# Patient Record
Sex: FEMALE | Race: ASIAN | Employment: OTHER | URBAN - METROPOLITAN AREA
[De-identification: names, ages, dates, MRNs, and addresses within clinical notes are randomized per-mention and may not be internally consistent; named-entity substitution may affect disease eponyms.]

---

## 2017-02-17 DIAGNOSIS — E78.00 ELEVATED CHOLESTEROL: ICD-10-CM

## 2017-02-17 DIAGNOSIS — Z23 NEED FOR PROPHYLACTIC VACCINATION AND INOCULATION AGAINST INFLUENZA: Primary | ICD-10-CM

## 2017-02-17 DIAGNOSIS — E03.9 HYPOTHYROIDISM: ICD-10-CM

## 2017-02-17 PROCEDURE — 90686 IIV4 VACC NO PRSV 0.5 ML IM: CPT | Mod: SL | Performed by: FAMILY MEDICINE

## 2017-02-17 PROCEDURE — 36415 COLL VENOUS BLD VENIPUNCTURE: CPT | Performed by: FAMILY MEDICINE

## 2017-02-17 PROCEDURE — 90471 IMMUNIZATION ADMIN: CPT | Performed by: FAMILY MEDICINE

## 2017-02-17 NOTE — PROGRESS NOTES
Injectable Influenza Immunization Documentation    1.  Is the person to be vaccinated sick today?  No    2. Does the person to be vaccinated have an allergy to eggs or to a component of the vaccine?  No    3. Has the person to be vaccinated today ever had a serious reaction to influenza vaccine in the past?  No    4. Has the person to be vaccinated ever had Guillain-[place] syndrome?  No     Form completed by Christiano Pretty    Future orders released today

## 2017-02-17 NOTE — LETTER
[facility] [address] [place]  [address]  Phone: [phone]    February 20, 2017      Frances Lai  [address]  [facility] [address]              Dear Frances,    The results from your recent visit showed the thyro  id test is abnormal.   It appears you are not taking enough thyroid medicine. Unfortunately the last drug change looks like it was decreased and the response I see doesn't make sense to me.     Please make and appointment to discuss with Dr Luis.Stay on the thyroid medicine until  You see her.     Sincerely,      Dr Maxx Alba MD for Dr. Luis who is out of the office.          Results for orders placed or performed in visit on 02/17/17   TSH ([facility])   Result Value Ref Range    TSH 12.980 (H) 0.450 - 4.500 uIU/mL    Narrative    Performed at:  01 - [facility] [address], [place], [place]  [id number]  : Meño Peterson MD, Phone:  [phone]   Lipid Panel ([facility])   Result Value Ref Range    Cholesterol 224 (H) 100 - 199 mg/dL    Triglycerides 88 0 - 149 mg/dL    HDL Cholesterol 59 >39 mg/dL    VLDL Cholesterol Benito 18 5 - 40 mg/dL    LDL Cholesterol Calculated 147 (H) 0 - 99 mg/dL    LDL/HDL Ratio 2.5 0.0 - 3.2 ratio units    Narrative    Performed at:  01 - [facility] [address], [place], [place]  [id number]  : Meño Peterson MD, Phone:  [phone]

## 2017-02-18 LAB
CHOLEST SERPL-MCNC: 224 MG/DL (ref 100–199)
HDLC SERPL-MCNC: 59 MG/DL
LDL/HDL RATIO: 2.5 RATIO UNITS (ref 0–3.2)
LDLC SERPL CALC-MCNC: 147 MG/DL (ref 0–99)
TRIGL SERPL-MCNC: 88 MG/DL (ref 0–149)
TSH BLD-ACNC: 12.98 UIU/ML (ref 0.45–4.5)
VLDLC SERPL CALC-MCNC: 18 MG/DL (ref 5–40)

## 2017-02-22 DIAGNOSIS — Z76.0 ENCOUNTER FOR MEDICATION REFILL: Primary | ICD-10-CM

## 2017-02-22 RX ORDER — LEVOTHYROXINE SODIUM 75 UG/1
75 TABLET ORAL DAILY
Qty: 90 TABLET | Refills: 2 | Status: SHIPPED | OUTPATIENT
Start: 2017-02-22

## 2017-03-22 DIAGNOSIS — Z76.0 ENCOUNTER FOR MEDICATION REFILL: ICD-10-CM

## 2017-03-22 DIAGNOSIS — E78.00 ELEVATED CHOLESTEROL: ICD-10-CM

## 2017-03-22 RX ORDER — PRAVASTATIN SODIUM 20 MG
TABLET ORAL
Qty: 90 TABLET | Refills: 6 | Status: SHIPPED | OUTPATIENT
Start: 2017-03-22 | End: 2018-10-31

## 2017-04-10 DIAGNOSIS — Z76.0 ENCOUNTER FOR MEDICATION REFILL: ICD-10-CM

## 2017-04-13 DIAGNOSIS — E03.9 HYPOTHYROIDISM, UNSPECIFIED TYPE: Primary | ICD-10-CM

## 2017-04-13 PROCEDURE — 36415 COLL VENOUS BLD VENIPUNCTURE: CPT | Performed by: FAMILY MEDICINE

## 2017-04-13 NOTE — LETTER
[facility] [address] [place]  [address]  Phone: [phone]    April 14, 2017      rFances Lai  [address]  [facility] [address]              Dear Frances,    Thyroid test is now normal. Continue the same dose of levothyroxine. Recheck one year.        Sincerely,     Lisa Luis M.D.    Results for orders placed or performed in visit on 04/13/17   TSH ([facility])   Result Value Ref Range    TSH 0.809 0.450 - 4.500 uIU/mL    Narrative    Performed at:  01 - [facility] [address], [place], [place]  [id number]  : Meño Peterson MD, Phone:  [phone]

## 2017-04-14 ENCOUNTER — TELEPHONE (OUTPATIENT)
Dept: FAMILY MEDICINE | Facility: CLINIC | Age: 62
End: 2017-04-14

## 2017-04-14 DIAGNOSIS — Z53.9 ERRONEOUS ENCOUNTER--DISREGARD: Primary | ICD-10-CM

## 2017-04-14 LAB — TSH BLD-ACNC: 0.81 UIU/ML (ref 0.45–4.5)

## 2017-04-14 NOTE — TELEPHONE ENCOUNTER
Prior authorization request form for diclofenac 1% gel completed and faxed.   Patient has been using for 1+ years for bilateral knee pain. Prior authorization had been approved 7/2015.   Will await response from insurance.  Sadia Nicole RN

## 2017-04-18 NOTE — TELEPHONE ENCOUNTER
"Received back fax from [employer] that states PA for diclofenac denied as stated \"This drug is experimental and not proven to be safe\".  Called [employer] to get cash price -$57.00.  With coupon from [employer] price would be down to $26.89.  Called patient and she states she can not pay this. She has an appointment scheduled with Dr Oliva for this week and will discuss at that time.   "

## 2017-04-20 ENCOUNTER — OFFICE VISIT (OUTPATIENT)
Dept: FAMILY MEDICINE | Facility: CLINIC | Age: 62
End: 2017-04-20

## 2017-04-20 VITALS
HEART RATE: 67 BPM | SYSTOLIC BLOOD PRESSURE: 96 MMHG | BODY MASS INDEX: 23.04 KG/M2 | WEIGHT: 128 LBS | OXYGEN SATURATION: 98 % | DIASTOLIC BLOOD PRESSURE: 60 MMHG

## 2017-04-20 DIAGNOSIS — M79.605 PAIN IN BOTH LOWER EXTREMITIES: Primary | ICD-10-CM

## 2017-04-20 DIAGNOSIS — M75.02 ADHESIVE CAPSULITIS OF LEFT SHOULDER: ICD-10-CM

## 2017-04-20 DIAGNOSIS — Z76.0 ENCOUNTER FOR MEDICATION REFILL: ICD-10-CM

## 2017-04-20 DIAGNOSIS — E03.8 OTHER SPECIFIED HYPOTHYROIDISM: ICD-10-CM

## 2017-04-20 DIAGNOSIS — M79.604 PAIN IN BOTH LOWER EXTREMITIES: Primary | ICD-10-CM

## 2017-04-20 PROCEDURE — 99214 OFFICE O/P EST MOD 30 MIN: CPT | Performed by: FAMILY MEDICINE

## 2017-04-20 RX ORDER — LEVOTHYROXINE SODIUM 50 UG/1
50 TABLET ORAL DAILY
Qty: 30 TABLET | Refills: 11 | Status: SHIPPED | OUTPATIENT
Start: 2017-04-20 | End: 2018-10-31

## 2017-04-20 NOTE — PROGRESS NOTES
Frances Lai is a 61 year old female who presents to the clinic today complaining of shoulder pain on the left side.    Onset of injury or pain occurred 1 year ago.  The pain is achy, constant and sharp and anterior.      Associated symptoms include Radiation of the pain  Weakness.    The patient has tried activity restriction and heat to improve symptoms.    Thyroid, feels tight in the throat, cough is there lots when laying down.    Diclofenac gel is no longer covered     Frances Lai  is a 61 year old  year old female presenting with a complaint of  Cough.   The  cough non-productive, without wheezing, dyspnea or hemoptysis for 6 months.   Quality: night time  Severity: moderate  Associated symptoms: none.      History of hypothyroidism.  On replacement therapy.  She has not experienced any significant side effects of this medication.   The patient denies of fatigue, weight changes, heat/cold intolerance, hair changes, nail changes, bowel changes.  Lab Results   Component Value Date    TSHLC 0.54 03/19/2014                Past Medical History:   Diagnosis Date     Colon polyp 6-,& 2012 2012,tubular adenoma,Q 5 years check rec       Dyslipidemia      HPV (human papilloma virus) anogenital infection 7 1 2009     Hyperlipidaemia LDL goal < 100      Hypothyroid      Osteopenia 2012    -1.8        Current Outpatient Prescriptions:      COMPRESSION STOCKINGS, 1 each daily, Disp: 1 each, Rfl: 3     levothyroxine (SYNTHROID/LEVOTHROID) 50 MCG tablet, Take 1 tablet (50 mcg) by mouth daily, Disp: 30 tablet, Rfl: 11     omeprazole (PRILOSEC) 20 MG CR capsule, Take 1 capsule (20 mg) by mouth daily, Disp: 30 capsule, Rfl: 1     diclofenac (VOLTAREN) 1 % GEL topical gel, PLACE ONTO THE SKIN TWICE DAILY AS NEEDED FOR MODERATE PAIN, Disp: 100 g, Rfl: 1     pravastatin (PRAVACHOL) 20 MG tablet, TAKE 3 TABS PO DAILY, Disp: 90 tablet, Rfl: 6     levothyroxine (SYNTHROID/LEVOTHROID) 75 MCG tablet, Take 1 tablet (75 mcg) by  mouth daily, Disp: 90 tablet, Rfl: 2     ibuprofen (ADVIL,MOTRIN) 600 MG tablet, Take 1 tablet (600 mg) by mouth every 6 hours as needed for moderate pain, Disp: 30 tablet, Rfl: 1     triamcinolone (KENALOG) 0.1 % ointment, Apply sparingly twice a day to affected area., Disp: 45 g, Rfl: 1     PREMARIN vaginal cream, , Disp: , Rfl: 0     PATADAY 0.2 % SOLN, INSTILL 1 DROP INTO BOTH EYES EVERY MORNING, Disp: , Rfl: 6     clotrimazole-betamethasone (LOTRISONE) cream, APPLY TO THE AFFECTED AREA TWICE DAILY AS NEEDED, Disp: 45 g, Rfl: 0     Glucosamine-Chondroit-Vit C-Mn (GLUCOSAMINE CHONDROITIN 1500 COMPLEX) CAPS, Take 1 tablet by mouth daily, Disp: 90 capsule, Rfl: 1     ORDER FOR DME, Equipment being ordered: plantar fasciitis  Night splint, Disp: 1 Device, Rfl: 0     cholecalciferol (VITAMIN D) 1000 UNIT tablet, TAKE 2 TABLETS BY MOUTH DAILY, Disp: 100 tablet, Rfl: 0     ORDER FOR DME, Equipment being ordered: compress stockings-  20 mm Hg, Disp: 1 Device, Rfl: 1     co-enzyme Q-10 100 MG CAPS, Take 1 capsule by mouth daily., Disp: 100 capsule, Rfl: 0     Calcium Carbonate-Vitamin D (CALCIUM-D PO), Take  by mouth 2 times daily., Disp: , Rfl:      [DISCONTINUED] levothyroxine (SYNTHROID, LEVOTHROID) 50 MCG tablet, Take 1 tablet (50 mcg) by mouth daily, Disp: 30 tablet, Rfl: 6     order for DME, Equipment being ordered: compression stockings 15-20  mm  Hg (Patient not taking: Reported on 4/20/2017), Disp: 1 Device, Rfl: 1    EXAM:  Blood pressure 96/60, pulse 67, weight 58.1 kg (128 lb), SpO2 98 %.  [unfilled] appears comfortable.  left SHOULDER EXAM:Impingment sign with internal rotation  Pain with internal rotation  Pain with external rotation  Pain with flexion  Pain with extension  Pain with abduction  Pain with adduction  weakness with extension  EXT: pulses intact. Sensation to light touch intact.    Assessment/Plan:  Frances was seen today for shoulder pain and recheck medication.    Diagnoses and all orders for  this visit:    Pain in both lower extremities  -     COMPRESSION STOCKINGS; 1 each daily    Other specified hypothyroidism  -     levothyroxine (SYNTHROID/LEVOTHROID) 50 MCG tablet; Take 1 tablet (50 mcg) by mouth daily    Adhesive capsulitis of left shoulder  -     ORTHOPEDICS ADULT REFERRAL  -     omeprazole (PRILOSEC) 20 MG CR capsule; Take 1 capsule (20 mg) by mouth daily    Encounter for medication refill  -     diclofenac (VOLTAREN) 1 % GEL topical gel; PLACE ONTO THE SKIN TWICE DAILY AS NEEDED FOR MODERATE PAIN    Orthopedic referral recommended  No lifting above the shoulders  No heavy lifting    Regan Oliva MD  [facility]  [phone]

## 2017-04-20 NOTE — MR AVS SNAPSHOT
"              After Visit Summary   4/20/2017    Frances Lai    MRN: [id number]           Patient Information     Date Of Birth          1955        Visit Information        Provider Department      4/20/2017 3:15 PM Regan Oliva MD [facility]        Today's Diagnoses     Pain in both lower extremities    -  1    Other specified hypothyroidism        Adhesive capsulitis of left shoulder        Encounter for medication refill          Care Instructions    If cough doesn't improve in two weeks let us know        Follow-ups after your visit        Additional Services     ORTHOPEDICS ADULT REFERRAL       Refer to [facility]  [facility]  Dr. Underwood or partner  Address: [address] [place] [address]    Phone: [phone]   Physicians                  Who to contact     If you have questions or need follow up information about today's clinic visit or your schedule please contact [facility] directly at [phone].  Normal or non-critical lab and imaging results will be communicated to you by MyChart, letter or phone within 4 business days after the clinic has received the results. If you do not hear from us within 7 days, please contact the clinic through MyChart or phone. If you have a critical or abnormal lab result, we will notify you by phone as soon as possible.  Submit refill requests through [employer] or call your pharmacy and they will forward the refill request to us. Please allow 3 business days for your refill to be completed.          Additional Information About Your Visit        MyChart Information     [employer] lets you send messages to your doctor, view your test results, renew your prescriptions, schedule appointments and more. To sign up, go to [email or web address] . Click on \"Log in\" on the left side of the screen, which will take you to the Welcome page. Then click on \"Sign up Now\" on the right side of the page.     You will be " asked to enter the access code listed below, as well as some personal information. Please follow the directions to create your username and password.     Your access code is: [email or web address]  Expires: 2017  3:51 PM     Your access code will  in 90 days. If you need help or a new code, please call your [facility] clinic or [phone].        Care EveryWhere ID     This is your Care EveryWhere ID. This could be used by other organizations to access your [facility] medical records  [id number]        Your Vitals Were     Pulse Pulse Oximetry BMI (Body Mass Index)             67 98% 23.04 kg/m2          Blood Pressure from Last 3 Encounters:   17 96/60   16 100/68   16 102/66    Weight from Last 3 Encounters:   17 58.1 kg (128 lb)   16 56.7 kg (125 lb)   16 58.1 kg (128 lb)              We Performed the Following     ORTHOPEDICS ADULT REFERRAL          Today's Medication Changes          These changes are accurate as of: 17  3:51 PM.  If you have any questions, ask your nurse or doctor.               Start taking these medicines.        Dose/Directions    COMPRESSION STOCKINGS   Used for:  Pain in both lower extremities   Started by:  Regan Oliva MD        Dose:  1 each   1 each daily   Quantity:  1 each   Refills:  3       omeprazole 20 MG CR capsule   Commonly known as:  priLOSEC   Used for:  Adhesive capsulitis of left shoulder   Started by:  Regan Oliva MD        Dose:  20 mg   Take 1 capsule (20 mg) by mouth daily   Quantity:  30 capsule   Refills:  1            Where to get your medicines      These medications were sent to [facility] [address] [address], [place] [address]     Phone:  [phone]     levothyroxine 50 MCG tablet    omeprazole 20 MG CR capsule         Some of these will need a paper prescription and others can be bought over the counter.  Ask your nurse if  you have questions.     Bring a paper prescription for each of these medications     COMPRESSION STOCKINGS         Call your pharmacy to confirm that your medication is ready for pickup. It may take up to 24 hours for them to receive the prescription. If the prescription is not ready within 3 business days, please contact your clinic or your provider.     We will let you know when these medications are ready. If you don't hear back within 3 business days, please contact us.     diclofenac 1 % Gel topical gel                Primary Care Provider Office Phone # Fax #    Lisa Emy Luis [phone]       [facility] [address]  [facility] [address]        Thank you!     Thank you for choosing [facility]  for your care. Our goal is always to provide you with excellent care. Hearing back from our patients is one way we can continue to improve our services. Please take a few minutes to complete the written survey that you may receive in the mail after your visit with us. Thank you!             Your Updated Medication List - Protect others around you: Learn how to safely use, store and throw away your medicines at www.disposemymeds.org.          This list is accurate as of: 4/20/17  3:51 PM.  Always use your most recent med list.                   Brand Name Dispense Instructions for use    CALCIUM-D PO      Take  by mouth 2 times daily.       cholecalciferol 1000 UNIT tablet    vitamin D    100 tablet    TAKE 2 TABLETS BY MOUTH DAILY       clotrimazole-betamethasone cream    LOTRISONE    45 g    APPLY TO THE AFFECTED AREA TWICE DAILY AS NEEDED       co-enzyme Q-10 100 MG Caps capsule     100 capsule    Take 1 capsule by mouth daily.       COMPRESSION STOCKINGS     1 each    1 each daily       diclofenac 1 % Gel topical gel    VOLTAREN    100 g    PLACE ONTO THE SKIN TWICE DAILY AS NEEDED FOR MODERATE PAIN       glucosamine chondroitin 1500 complex Caps     90 capsule     Take 1 tablet by mouth daily       ibuprofen 600 MG tablet    ADVIL/MOTRIN    30 tablet    Take 1 tablet (600 mg) by mouth every 6 hours as needed for moderate pain       * levothyroxine 75 MCG tablet    SYNTHROID/LEVOTHROID    90 tablet    Take 1 tablet (75 mcg) by mouth daily       * levothyroxine 50 MCG tablet    SYNTHROID/LEVOTHROID    30 tablet    Take 1 tablet (50 mcg) by mouth daily       omeprazole 20 MG CR capsule    priLOSEC    30 capsule    Take 1 capsule (20 mg) by mouth daily       * order for DME     1 Device    Equipment being ordered: compress stockings-  20 mm Hg       * order for DME     1 Device    Equipment being ordered: plantar fasciitis  Night splint       * order for DME     1 Device    Equipment being ordered: compression stockings 15-20  mm  Hg       PATADAY 0.2 % Soln   Generic drug:  olopatadine HCl      INSTILL 1 DROP INTO BOTH EYES EVERY MORNING       pravastatin 20 MG tablet    PRAVACHOL    90 tablet    TAKE 3 TABS PO DAILY       PREMARIN cream   Generic drug:  conjugated estrogens          triamcinolone 0.1 % ointment    KENALOG    45 g    Apply sparingly twice a day to affected area.       * Notice:  This list has 5 medication(s) that are the same as other medications prescribed for you. Read the directions carefully, and ask your doctor or other care provider to review them with you.

## 2017-04-21 ENCOUNTER — TELEPHONE (OUTPATIENT)
Dept: FAMILY MEDICINE | Facility: CLINIC | Age: 62
End: 2017-04-21

## 2017-04-21 DIAGNOSIS — M79.605 PAINFUL LEGS AND MOVING TOES: Primary | ICD-10-CM

## 2017-04-21 DIAGNOSIS — M79.604 PAINFUL LEGS AND MOVING TOES: Primary | ICD-10-CM

## 2017-04-21 NOTE — PROGRESS NOTES
Received call from Mrs. Lai.  1) Requesting order for compression stocking be sent to [facility]-Phone) [phone] and fax) [phone].  Order was faxed    2) talked about [facility] order and appointment availability.  She can not get in for 3 months.  She was not happy she had to wait to long.  Told her she could try another one of their facilities to see if she could get in sooner or try another facility all together    3) talked about her diclofenac gel.  She states it is too expensive.  Per triage, there is no other choices.  She can take ibuprofen-over the counter or pay the $26.00.  See previous telephone encounter from 4/14/17    Rommel Snell,   [facility]  [phone]      I am trying to reach the patient to tell her about the diclofenac gel.  There is no answer and she does not have a voice mail

## 2017-04-21 NOTE — TELEPHONE ENCOUNTER
Received call from Mrs. Lai.  1) Requesting order for compression stocking be sent to [facility] medical equipment-Phone) [phone] and fax) [phone]. Order was faxed     2) talked about [facility] order and appointment availability. She can not get in for 3 months. She was not happy she had to wait to long. Told her she could try another one of their facilities to see if she could get in sooner or try another facility all together     3) talked about her diclofenac gel. She states it is too expensive. Per triage, there is no other choices. She can take ibuprofen-over the counter or pay the $26.00. See previous telephone encounter from 4/14/17     Rommel Snell,   [facility]  [phone]        I am trying to reach the patient to tell her about the diclofenac gel. There is no answer and she does not have a voice mail

## 2017-04-24 DIAGNOSIS — Z76.0 ENCOUNTER FOR MEDICATION REFILL: ICD-10-CM

## 2017-04-24 RX ORDER — CLOTRIMAZOLE AND BETAMETHASONE DIPROPIONATE 10; .64 MG/G; MG/G
CREAM TOPICAL
Qty: 45 G | Refills: 0 | Status: SHIPPED | OUTPATIENT
Start: 2017-04-24

## 2017-04-26 ENCOUNTER — MEDICAL CORRESPONDENCE (OUTPATIENT)
Dept: FAMILY MEDICINE | Facility: CLINIC | Age: 62
End: 2017-04-26

## 2017-04-27 ENCOUNTER — RADIANT APPOINTMENT (OUTPATIENT)
Dept: GENERAL RADIOLOGY | Facility: CLINIC | Age: 62
End: 2017-04-27
Attending: ORTHOPAEDIC SURGERY
Payer: COMMERCIAL

## 2017-04-27 DIAGNOSIS — R52 PAIN: ICD-10-CM

## 2017-04-27 PROCEDURE — 73030 X-RAY EXAM OF SHOULDER: CPT | Mod: TC

## 2017-04-27 NOTE — TELEPHONE ENCOUNTER
Per note on bottom on pharmacy refill request:    4/26/17 Sadia left a voice mail message asking them to break up RX and dispense tube of each separately and tell patient to mix separately.    Rommel Snell,   [facility]  [phone]

## 2017-05-08 DIAGNOSIS — M25.562 CHRONIC PAIN OF LEFT KNEE: ICD-10-CM

## 2017-05-08 DIAGNOSIS — G89.29 CHRONIC PAIN OF LEFT KNEE: ICD-10-CM

## 2017-05-08 RX ORDER — IBUPROFEN 600 MG/1
600 TABLET, FILM COATED ORAL EVERY 6 HOURS PRN
Qty: 30 TABLET | Refills: 1 | Status: SHIPPED | OUTPATIENT
Start: 2017-05-08 | End: 2018-10-31

## 2017-06-26 DIAGNOSIS — M75.02 ADHESIVE CAPSULITIS OF LEFT SHOULDER: ICD-10-CM

## 2017-06-26 NOTE — TELEPHONE ENCOUNTER
omeprazole  last OV - 7/30/15- cpx last labs 7/26/16  Melany Mariee MA June 26, 2017 2:31 PM    Last Basic Metabolic Panel:  Lab Results   Component Value Date     07/26/2016      Lab Results   Component Value Date    POTASSIUM 4.2 07/26/2016     Lab Results   Component Value Date    CHLORIDE 102 07/26/2016     Lab Results   Component Value Date    SUSANNE 9.7 07/26/2016     No results found for: CO2  Lab Results   Component Value Date    BUN 13 07/26/2016    BUN 22 07/26/2016     Lab Results   Component Value Date    CR 0.58 07/26/2016     Lab Results   Component Value Date     07/26/2016

## 2017-07-31 DIAGNOSIS — M75.02 ADHESIVE CAPSULITIS OF LEFT SHOULDER: ICD-10-CM

## 2018-01-31 ENCOUNTER — TELEPHONE (OUTPATIENT)
Dept: FAMILY MEDICINE | Facility: CLINIC | Age: 63
End: 2018-01-31

## 2018-10-04 ENCOUNTER — HOSPITAL ENCOUNTER (OUTPATIENT)
Facility: CLINIC | Age: 63
End: 2018-10-04
Attending: PODIATRIST | Admitting: PODIATRIST
Payer: COMMERCIAL

## 2018-10-06 ENCOUNTER — HEALTH MAINTENANCE LETTER (OUTPATIENT)
Age: 63
End: 2018-10-06

## 2018-10-31 RX ORDER — AZELASTINE HYDROCHLORIDE 0.5 MG/ML
1 SOLUTION/ DROPS OPHTHALMIC DAILY
COMMUNITY

## 2018-10-31 RX ORDER — BETAMETHASONE DIPROPIONATE 0.5 MG/G
OINTMENT, AUGMENTED TOPICAL PRN
COMMUNITY

## 2018-10-31 RX ORDER — FLUOCINOLONE ACETONIDE 0.11 MG/ML
OIL TOPICAL
COMMUNITY

## 2018-11-01 ENCOUNTER — HOSPITAL ENCOUNTER (OUTPATIENT)
Facility: CLINIC | Age: 63
Discharge: HOME | End: 2018-11-01
Attending: PODIATRIST | Admitting: PODIATRIST
Payer: COMMERCIAL

## 2018-11-01 ENCOUNTER — ANESTHESIA (OUTPATIENT)
Dept: SURGERY | Facility: CLINIC | Age: 63
End: 2018-11-01
Payer: COMMERCIAL

## 2018-11-01 ENCOUNTER — ANESTHESIA EVENT (OUTPATIENT)
Dept: SURGERY | Facility: CLINIC | Age: 63
End: 2018-11-01
Payer: COMMERCIAL

## 2018-11-01 VITALS
BODY MASS INDEX: 23.02 KG/M2 | HEART RATE: 66 BPM | TEMPERATURE: 96.8 F | OXYGEN SATURATION: 95 % | WEIGHT: 125.1 LBS | RESPIRATION RATE: 16 BRPM | SYSTOLIC BLOOD PRESSURE: 114 MMHG | HEIGHT: 62 IN | DIASTOLIC BLOOD PRESSURE: 62 MMHG

## 2018-11-01 DIAGNOSIS — M20.12 HALLUX ABDUCTO VALGUS, LEFT: Primary | ICD-10-CM

## 2018-11-01 PROCEDURE — 25000125 ZZHC RX 250: Performed by: PODIATRIST

## 2018-11-01 PROCEDURE — 25000128 H RX IP 250 OP 636: Performed by: NURSE ANESTHETIST, CERTIFIED REGISTERED

## 2018-11-01 PROCEDURE — 25000128 H RX IP 250 OP 636: Performed by: PODIATRIST

## 2018-11-01 PROCEDURE — 25000125 ZZHC RX 250: Performed by: NURSE ANESTHETIST, CERTIFIED REGISTERED

## 2018-11-01 PROCEDURE — 36000056 ZZH SURGERY LEVEL 3 1ST 30 MIN: Performed by: PODIATRIST

## 2018-11-01 PROCEDURE — 71000012 ZZH RECOVERY PHASE 1 LEVEL 1 FIRST HR: Performed by: PODIATRIST

## 2018-11-01 PROCEDURE — 40000170 ZZH STATISTIC PRE-PROCEDURE ASSESSMENT II: Performed by: PODIATRIST

## 2018-11-01 PROCEDURE — 27210794 ZZH OR GENERAL SUPPLY STERILE: Performed by: PODIATRIST

## 2018-11-01 PROCEDURE — 25000128 H RX IP 250 OP 636: Performed by: ANESTHESIOLOGY

## 2018-11-01 PROCEDURE — 37000008 ZZH ANESTHESIA TECHNICAL FEE, 1ST 30 MIN: Performed by: PODIATRIST

## 2018-11-01 PROCEDURE — 27110028 ZZH OR GENERAL SUPPLY NON-STERILE: Performed by: PODIATRIST

## 2018-11-01 PROCEDURE — C1713 ANCHOR/SCREW BN/BN,TIS/BN: HCPCS | Performed by: PODIATRIST

## 2018-11-01 PROCEDURE — 27211024 ZZHC OR SUPPLY OTHER OPNP: Performed by: PODIATRIST

## 2018-11-01 PROCEDURE — 36000058 ZZH SURGERY LEVEL 3 EA 15 ADDTL MIN: Performed by: PODIATRIST

## 2018-11-01 PROCEDURE — 37000009 ZZH ANESTHESIA TECHNICAL FEE, EACH ADDTL 15 MIN: Performed by: PODIATRIST

## 2018-11-01 PROCEDURE — 71000027 ZZH RECOVERY PHASE 2 EACH 15 MINS: Performed by: PODIATRIST

## 2018-11-01 DEVICE — IMPLANTABLE DEVICE: Type: IMPLANTABLE DEVICE | Site: FOOT | Status: FUNCTIONAL

## 2018-11-01 DEVICE — IMP WIRE KIRSCHNER 0.045X4" 78.2020: Type: IMPLANTABLE DEVICE | Site: FOOT | Status: FUNCTIONAL

## 2018-11-01 RX ORDER — FENTANYL CITRATE 50 UG/ML
25-50 INJECTION, SOLUTION INTRAMUSCULAR; INTRAVENOUS
Status: DISCONTINUED | OUTPATIENT
Start: 2018-11-01 | End: 2018-11-01 | Stop reason: HOSPADM

## 2018-11-01 RX ORDER — IBUPROFEN 600 MG/1
600 TABLET, FILM COATED ORAL EVERY 8 HOURS PRN
Qty: 15 TABLET | Refills: 0 | Status: SHIPPED | OUTPATIENT
Start: 2018-11-01

## 2018-11-01 RX ORDER — KETOROLAC TROMETHAMINE 30 MG/ML
INJECTION, SOLUTION INTRAMUSCULAR; INTRAVENOUS PRN
Status: DISCONTINUED | OUTPATIENT
Start: 2018-11-01 | End: 2018-11-01

## 2018-11-01 RX ORDER — SODIUM CHLORIDE, SODIUM LACTATE, POTASSIUM CHLORIDE, CALCIUM CHLORIDE 600; 310; 30; 20 MG/100ML; MG/100ML; MG/100ML; MG/100ML
INJECTION, SOLUTION INTRAVENOUS CONTINUOUS PRN
Status: DISCONTINUED | OUTPATIENT
Start: 2018-11-01 | End: 2018-11-01

## 2018-11-01 RX ORDER — BUPIVACAINE HYDROCHLORIDE 5 MG/ML
INJECTION, SOLUTION EPIDURAL; INTRACAUDAL
Status: DISCONTINUED
Start: 2018-11-01 | End: 2018-11-01 | Stop reason: HOSPADM

## 2018-11-01 RX ORDER — EPHEDRINE SULFATE 50 MG/ML
INJECTION, SOLUTION INTRAMUSCULAR; INTRAVENOUS; SUBCUTANEOUS PRN
Status: DISCONTINUED | OUTPATIENT
Start: 2018-11-01 | End: 2018-11-01

## 2018-11-01 RX ORDER — FENTANYL CITRATE 50 UG/ML
50-100 INJECTION, SOLUTION INTRAMUSCULAR; INTRAVENOUS
Status: DISCONTINUED | OUTPATIENT
Start: 2018-11-01 | End: 2018-11-01 | Stop reason: HOSPADM

## 2018-11-01 RX ORDER — PROPOFOL 10 MG/ML
INJECTION, EMULSION INTRAVENOUS PRN
Status: DISCONTINUED | OUTPATIENT
Start: 2018-11-01 | End: 2018-11-01

## 2018-11-01 RX ORDER — DEXAMETHASONE SODIUM PHOSPHATE 4 MG/ML
INJECTION, SOLUTION INTRA-ARTICULAR; INTRALESIONAL; INTRAMUSCULAR; INTRAVENOUS; SOFT TISSUE PRN
Status: DISCONTINUED | OUTPATIENT
Start: 2018-11-01 | End: 2018-11-01 | Stop reason: HOSPADM

## 2018-11-01 RX ORDER — CEFAZOLIN SODIUM 2 G/100ML
2 INJECTION, SOLUTION INTRAVENOUS
Status: COMPLETED | OUTPATIENT
Start: 2018-11-01 | End: 2018-11-01

## 2018-11-01 RX ORDER — ONDANSETRON 2 MG/ML
4 INJECTION INTRAMUSCULAR; INTRAVENOUS EVERY 30 MIN PRN
Status: DISCONTINUED | OUTPATIENT
Start: 2018-11-01 | End: 2018-11-01 | Stop reason: HOSPADM

## 2018-11-01 RX ORDER — HYDROMORPHONE HYDROCHLORIDE 1 MG/ML
.3-.5 INJECTION, SOLUTION INTRAMUSCULAR; INTRAVENOUS; SUBCUTANEOUS EVERY 10 MIN PRN
Status: DISCONTINUED | OUTPATIENT
Start: 2018-11-01 | End: 2018-11-01 | Stop reason: HOSPADM

## 2018-11-01 RX ORDER — DEXAMETHASONE SODIUM PHOSPHATE 4 MG/ML
INJECTION, SOLUTION INTRA-ARTICULAR; INTRALESIONAL; INTRAMUSCULAR; INTRAVENOUS; SOFT TISSUE PRN
Status: DISCONTINUED | OUTPATIENT
Start: 2018-11-01 | End: 2018-11-01

## 2018-11-01 RX ORDER — ONDANSETRON 4 MG/1
4 TABLET, ORALLY DISINTEGRATING ORAL EVERY 30 MIN PRN
Status: DISCONTINUED | OUTPATIENT
Start: 2018-11-01 | End: 2018-11-01 | Stop reason: HOSPADM

## 2018-11-01 RX ORDER — HYDROCODONE BITARTRATE AND ACETAMINOPHEN 5; 325 MG/1; MG/1
1-2 TABLET ORAL EVERY 6 HOURS PRN
Qty: 20 TABLET | Refills: 0 | Status: SHIPPED | OUTPATIENT
Start: 2018-11-01

## 2018-11-01 RX ORDER — MEPERIDINE HYDROCHLORIDE 25 MG/ML
12.5 INJECTION INTRAMUSCULAR; INTRAVENOUS; SUBCUTANEOUS
Status: DISCONTINUED | OUTPATIENT
Start: 2018-11-01 | End: 2018-11-01 | Stop reason: HOSPADM

## 2018-11-01 RX ORDER — LIDOCAINE HYDROCHLORIDE 20 MG/ML
INJECTION, SOLUTION INFILTRATION; PERINEURAL PRN
Status: DISCONTINUED | OUTPATIENT
Start: 2018-11-01 | End: 2018-11-01

## 2018-11-01 RX ORDER — SODIUM CHLORIDE, SODIUM LACTATE, POTASSIUM CHLORIDE, CALCIUM CHLORIDE 600; 310; 30; 20 MG/100ML; MG/100ML; MG/100ML; MG/100ML
INJECTION, SOLUTION INTRAVENOUS CONTINUOUS
Status: DISCONTINUED | OUTPATIENT
Start: 2018-11-01 | End: 2018-11-01 | Stop reason: HOSPADM

## 2018-11-01 RX ORDER — DEXAMETHASONE SODIUM PHOSPHATE 4 MG/ML
INJECTION, SOLUTION INTRA-ARTICULAR; INTRALESIONAL; INTRAMUSCULAR; INTRAVENOUS; SOFT TISSUE
Status: DISCONTINUED
Start: 2018-11-01 | End: 2018-11-01 | Stop reason: HOSPADM

## 2018-11-01 RX ORDER — NALOXONE HYDROCHLORIDE 0.4 MG/ML
.1-.4 INJECTION, SOLUTION INTRAMUSCULAR; INTRAVENOUS; SUBCUTANEOUS
Status: DISCONTINUED | OUTPATIENT
Start: 2018-11-01 | End: 2018-11-01 | Stop reason: HOSPADM

## 2018-11-01 RX ORDER — LIDOCAINE HYDROCHLORIDE 20 MG/ML
INJECTION, SOLUTION INFILTRATION; PERINEURAL
Status: DISCONTINUED
Start: 2018-11-01 | End: 2018-11-01 | Stop reason: HOSPADM

## 2018-11-01 RX ADMIN — MIDAZOLAM 2 MG: 1 INJECTION INTRAMUSCULAR; INTRAVENOUS at 13:54

## 2018-11-01 RX ADMIN — PROPOFOL 170 MG: 10 INJECTION, EMULSION INTRAVENOUS at 13:57

## 2018-11-01 RX ADMIN — Medication 5 MG: at 13:58

## 2018-11-01 RX ADMIN — LIDOCAINE HYDROCHLORIDE 100 MG: 20 INJECTION, SOLUTION INFILTRATION; PERINEURAL at 13:57

## 2018-11-01 RX ADMIN — Medication 5 MG: at 14:07

## 2018-11-01 RX ADMIN — KETOROLAC TROMETHAMINE 30 MG: 30 INJECTION, SOLUTION INTRAMUSCULAR at 15:06

## 2018-11-01 RX ADMIN — FENTANYL CITRATE 50 MCG: 50 INJECTION, SOLUTION INTRAMUSCULAR; INTRAVENOUS at 13:57

## 2018-11-01 RX ADMIN — SODIUM CHLORIDE, POTASSIUM CHLORIDE, SODIUM LACTATE AND CALCIUM CHLORIDE: 600; 310; 30; 20 INJECTION, SOLUTION INTRAVENOUS at 13:50

## 2018-11-01 RX ADMIN — CEFAZOLIN SODIUM 2 G: 2 INJECTION, SOLUTION INTRAVENOUS at 14:05

## 2018-11-01 RX ADMIN — DEXAMETHASONE SODIUM PHOSPHATE 4 MG: 4 INJECTION, SOLUTION INTRA-ARTICULAR; INTRALESIONAL; INTRAMUSCULAR; INTRAVENOUS; SOFT TISSUE at 14:17

## 2018-11-01 ASSESSMENT — COPD QUESTIONNAIRES: COPD: 0

## 2018-11-01 ASSESSMENT — LIFESTYLE VARIABLES: TOBACCO_USE: 0

## 2018-11-01 NOTE — ANESTHESIA CARE TRANSFER NOTE
Patient: Frances Lai    Procedure(s):  BUNIONECTOMY LEFT FOOT (TRILLIANT SCREWS, OSCILLATING SAW)^    Diagnosis: HALLUX VALGUS   Diagnosis Additional Information: No value filed.    Anesthesia Type:   MAC     Note:  Airway :Room Air  Patient transferred to:PACU  Handoff Report: Identifed the Patient, Identified the Reponsible Provider, Reviewed the pertinent medical history, Discussed the surgical course, Reviewed Intra-OP anesthesia mangement and issues during anesthesia, Set expectations for post-procedure period and Allowed opportunity for questions and acknowledgement of understanding    Via recliner,talking, report to RN  Vitals: (Last set prior to Anesthesia Care Transfer)    CRNA VITALS  11/1/2018 1449 - 11/1/2018 1519      11/1/2018             Resp Rate (set): 10      BP: 102/57  P: 69  SAO2:100          Electronically Signed By: JON Deleon CRNA  November 1, 2018  3:19 PM

## 2018-11-01 NOTE — ANESTHESIA PREPROCEDURE EVALUATION
Anesthesia Evaluation     . Pt has had prior anesthetic. Type: General    No history of anesthetic complications          ROS/MED HX    ENT/Pulmonary:      (-) tobacco use, asthma, COPD and sleep apnea   Neurologic:       Cardiovascular:     (+) Dyslipidemia, ----. : . . . :. .      (-) CAD and CHF   METS/Exercise Tolerance:     Hematologic:         Musculoskeletal:         GI/Hepatic:        (-) GERD and liver disease   Renal/Genitourinary:      (-) renal disease   Endo:     (+) thyroid problem hypothyroidism, .      Psychiatric:         Infectious Disease:         Malignancy:         Other:                     Physical Exam  Normal systems: cardiovascular, pulmonary and dental    Airway   Mallampati: II  TM distance: >3 FB  Neck ROM: full    Dental     Cardiovascular       Pulmonary                     Anesthesia Plan      History & Physical Review  History and physical reviewed and following examination; no interval change.    ASA Status:  2 .    NPO Status:  > 8 hours    Plan for MAC Reason for MAC:  Deep or markedly invasive procedure (G8)  PONV prophylaxis:  Ondansetron (or other 5HT-3) and Dexamethasone or Solumedrol       Postoperative Care  Postoperative pain management:  Multi-modal analgesia.      Consents  Anesthetic plan, risks, benefits and alternatives discussed with:  Patient..                          .

## 2018-11-01 NOTE — DISCHARGE INSTRUCTIONS
Same Day Surgery Discharge Instructions for  Sedation and General Anesthesia       It's not unusual to feel dizzy, light-headed or faint for up to 24 hours after surgery or while taking pain medication.  If you have these symptoms: sit for a few minutes before standing and have someone assist you when you get up to walk or use the bathroom.      You should rest and relax for the next 24 hours. We recommend you make arrangements to have an adult stay with you for at least 24 hours after your discharge.  Avoid hazardous and strenuous activity.      DO NOT DRIVE any vehicle or operate mechanical equipment for 24 hours following the end of your surgery.  Even though you may feel normal, your reactions may be affected by the medication you have received.      Do not drink alcoholic beverages for 24 hours following surgery.       Slowly progress to your regular diet as you feel able. It's not unusual to feel nauseated and/or vomit after receiving anesthesia.  If you develop these symptoms, drink clear liquids (apple juice, ginger ale, broth, 7-up, etc. ) until you feel better.  If your nausea and vomiting persists for 24 hours, please notify your surgeon.        All narcotic pain medications, along with inactivity and anesthesia, can cause constipation. Drinking plenty of liquids and increasing fiber intake will help.      For any questions of a medical nature, call your surgeon.      Do not make important decisions for 24 hours.      If you had general anesthesia, you may have a sore throat for a couple of days related to the breathing tube used during surgery.  You may use Cepacol lozenges to help with this discomfort.  If it worsens or if you develop a fever, contact your surgeon.       If you feel your pain is not well managed with the pain medications prescribed by your surgeon, please contact your surgeon's office to let them know so they can address your concerns.       [facility]  Discharge  Instructions  Foot and Ankle Surgery  Aj Powers DPM          Apply ice packs. Post-operative swelling and pain are greatly reduced with the use of ice packs. The first 2-3 days after surgery use ice packs 20 to 30 minutes of each hour. The ice packs should be applied to the top and on the inside of the ankle. You may apply ice packs behind the knee. It is not necessary to apply the ice packs during the night while you are sleeping.    Take medication(s) prescribed by your physician according to directions.    Do not be alarmed if there is some slight bleeding on the bandages, as this is normal. If the bleeding seems excessive, call Dr. Powers.    Follow a light diet, drink plenty of fluids and abstain from the use of any alcoholic beverages for three days following surgery.    Sit in a chair with feet elevated. Use surgical shoe(s) and/or crutches, wheelchair or knee and leg walker as directed by Dr. Powers.    Keep bandages completely dry. If any of the ice packs show evidence of becoming damp, do not use them.    Do not remove bandages.    Do not cover the surgical dressing with a plastic bag, unless it is for a short time to bathe.    If any problems develop, contact Dr. Powers immediately at [phone].    Carefully following these instructions aids in the successful outcome of your surgery.    **If you have questions or concerns about your procedure,  call Dr. Powers at [phone]**

## 2018-11-01 NOTE — IP AVS SNAPSHOT
MRN:[id number]                      After Visit Summary   11/1/2018    Frances Lai    MRN: [id number]           Thank you!     Thank you for choosing [facility] for your care. Our goal is always to provide you with excellent care. Hearing back from our patients is one way we can continue to improve our services. Please take a few minutes to complete the written survey that you may receive in the mail after you visit with us. Thank you!        Patient Information     Date Of Birth          1955        About your hospital stay     You were admitted on:  November 1, 2018 You last received care in the:  [facility] Same Day Surgery    You were discharged on:  November 1, 2018       Who to Call     For medical emergencies, please call 911.  For non-urgent questions about your medical care, please call your primary care provider or clinic, [phone]  For questions related to your surgery, please call your surgery clinic        Attending Provider     Provider Specialty    Aj Powers DPM Podiatry       Primary Care Provider Office Phone # Fax #    Lisa Emy Luis [phone]      After Care Instructions     Discharge Instructions       Review discharge instructions as directed by Provider.            Discharge Instructions       Patient to arrange for return to clinic appointment one week.            Elevate affected extremity           Ice to affected area       Ice pack to affected area PRN (as needed).            No Alcohol       for 24 hours post-procedure            No dressing change       until follow up clinic appointment.            No driving or operating machinery       until the day after procedure            Weight bearing status - Foot flat                 Further instructions from your care team       Same Day Surgery Discharge Instructions for  Sedation and General Anesthesia       It's not unusual to feel dizzy, light-headed or faint for up to 24  hours after surgery or while taking pain medication.  If you have these symptoms: sit for a few minutes before standing and have someone assist you when you get up to walk or use the bathroom.      You should rest and relax for the next 24 hours. We recommend you make arrangements to have an adult stay with you for at least 24 hours after your discharge.  Avoid hazardous and strenuous activity.      DO NOT DRIVE any vehicle or operate mechanical equipment for 24 hours following the end of your surgery.  Even though you may feel normal, your reactions may be affected by the medication you have received.      Do not drink alcoholic beverages for 24 hours following surgery.       Slowly progress to your regular diet as you feel able. It's not unusual to feel nauseated and/or vomit after receiving anesthesia.  If you develop these symptoms, drink clear liquids (apple juice, ginger ale, broth, 7-up, etc. ) until you feel better.  If your nausea and vomiting persists for 24 hours, please notify your surgeon.        All narcotic pain medications, along with inactivity and anesthesia, can cause constipation. Drinking plenty of liquids and increasing fiber intake will help.      For any questions of a medical nature, call your surgeon.      Do not make important decisions for 24 hours.      If you had general anesthesia, you may have a sore throat for a couple of days related to the breathing tube used during surgery.  You may use Cepacol lozenges to help with this discomfort.  If it worsens or if you develop a fever, contact your surgeon.       If you feel your pain is not well managed with the pain medications prescribed by your surgeon, please contact your surgeon's office to let them know so they can address your concerns.       [facility]  Discharge Instructions  Foot and Ankle Surgery  Aj Powers DPM          Apply ice packs. Post-operative swelling and pain are greatly reduced with the use of ice  "packs. The first 2-3 days after surgery use ice packs 20 to 30 minutes of each hour. The ice packs should be applied to the top and on the inside of the ankle. You may apply ice packs behind the knee. It is not necessary to apply the ice packs during the night while you are sleeping.    Take medication(s) prescribed by your physician according to directions.    Do not be alarmed if there is some slight bleeding on the bandages, as this is normal. If the bleeding seems excessive, call Dr. Powers.    Follow a light diet, drink plenty of fluids and abstain from the use of any alcoholic beverages for three days following surgery.    Sit in a chair with feet elevated. Use surgical shoe(s) and/or crutches, wheelchair or knee and leg walker as directed by Dr. Powers.    Keep bandages completely dry. If any of the ice packs show evidence of becoming damp, do not use them.    Do not remove bandages.    Do not cover the surgical dressing with a plastic bag, unless it is for a short time to bathe.    If any problems develop, contact Dr. Powers immediately at [phone].    Carefully following these instructions aids in the successful outcome of your surgery.    **If you have questions or concerns about your procedure,  call Dr. Powers at [phone]**                              Pending Results     No orders found from 10/30/2018 to 11/2/2018.            Admission Information     Date & Time Provider Department Dept. Phone    11/1/2018 Aj Powers, LONI [facility] Same Day Surgery [phone]      Your Vitals Were     Blood Pressure Pulse Temperature Respirations Height Weight    108/62 66 96.8  F (36  C) 18 1.575 m (5' 2\") 56.7 kg (125 lb 1.6 oz)    Pulse Oximetry BMI (Body Mass Index)                98% 22.88 kg/m2          MyChart Information     [employer] lets you send messages to your doctor, view your test results, renew your prescriptions, schedule appointments and more. To sign up, go to " "[email or web address] . Click on \"Log in\" on the left side of the screen, which will take you to the Welcome page. Then click on \"Sign up Now\" on the right side of the page.     You will be asked to enter the access code listed below, as well as some personal information. Please follow the directions to create your username and password.     Your access code is: [email or web address]  Expires: 2019  3:25 PM     Your access code will  in 90 days. If you need help or a new code, please call your [facility] clinic or [phone].        Care EveryWhere ID     This is your Care EveryWhere ID. This could be used by other organizations to access your [facility] medical records  [id number]        Equal Access to Services     CRISTHIAN CAMARILLO : Danie Gong, jo encarnacion, christopher pérez, rachel jaimes . [facility] [phone].    ATENCIÓN: Si habla español, tiene a bradford disposición servicios gratuitos de asistencia lingüística. Rodri al [phone].    We comply with applicable federal civil rights laws and [place] laws. We do not discriminate on the basis of race, color, national origin, age, disability, sex, sexual orientation, or gender identity.               Review of your medicines      UNREVIEWED medicines. Ask your doctor about these medicines        Dose / Directions    azelastine 0.05 % ophthalmic solution   Commonly known as:  OPTIVAR        Dose:  1 drop   Place 1 drop into both eyes daily   Refills:  0       CALCIUM-D PO        Take by mouth daily   Refills:  0       levothyroxine 75 MCG tablet   Commonly known as:  SYNTHROID/LEVOTHROID   Used for:  Encounter for medication refill        Dose:  75 mcg   Take 1 tablet (75 mcg) by mouth daily   Quantity:  90 tablet   Refills:  2         START taking        Dose / Directions    HYDROcodone-acetaminophen 5-325 MG per tablet   Commonly known as:  NORCO   Used for:  Hallux abducto valgus, left        Dose:  1-2 " tablet   Take 1-2 tablets by mouth every 6 hours as needed for moderate to severe pain   Quantity:  20 tablet   Refills:  0       ibuprofen 600 MG tablet   Commonly known as:  ADVIL/MOTRIN   Used for:  Hallux abducto valgus, left   Notes to Patient:  Today you received Toradol, an antiinflammatory medication similar to Ibuprofen.  You should not take other antiinflammatory medication, such as Ibuprofen, Motrin, Advil, Aleve, Naprosyn, etc until 9pm.         Dose:  600 mg   Take 1 tablet (600 mg) by mouth every 8 hours as needed for other (mild and/or inflammatory pain) Take 1 tablet (600) by mouth with food  every 8 hours for 5 days   Quantity:  15 tablet   Refills:  0         CONTINUE these medicines which have NOT CHANGED        Dose / Directions    augmented betamethasone dipropionate 0.05 % ointment   Commonly known as:  DIPROLENE-AF        Apply topically as needed   Refills:  0       clotrimazole-betamethasone cream   Commonly known as:  LOTRISONE   Used for:  Encounter for medication refill        APPLY TO THE AFFECTED AREA TWICE DAILY AS NEEDED   Quantity:  45 g   Refills:  0       DERMA-SMOOTHE/FS SCALP 0.01 % Oil oil   Generic drug:  Fluocinolone Acetonide Scalp        Apply topically 2 times daily   Refills:  0       diclofenac 1 % Gel topical gel   Commonly known as:  VOLTAREN        Place onto the skin 2 times daily as needed for moderate pain   Refills:  0       PRAVACHOL PO        Dose:  40 mg   Take 40 mg by mouth daily   Refills:  0       triamcinolone 0.1 % ointment   Commonly known as:  KENALOG   Used for:  Rash and nonspecific skin eruption        Apply sparingly twice a day to affected area.   Quantity:  45 g   Refills:  1            Where to get your medicines      Some of these will need a paper prescription and others can be bought over the counter. Ask your nurse if you have questions.     Bring a paper prescription for each of these medications     HYDROcodone-acetaminophen 5-325 MG per  tablet    ibuprofen 600 MG tablet                Protect others around you: Learn how to safely use, store and throw away your medicines at www.disposemymeds.org.        Information about OPIOIDS     PRESCRIPTION OPIOIDS: WHAT YOU NEED TO KNOW   We gave you an opioid (narcotic) pain medicine. It is important to manage your pain, but opioids are not always the best choice. You should first try all the other options your care team gave you. Take this medicine for as short a time (and as few doses) as possible.    Some activities can increase your pain, such as bandage changes or therapy sessions. It may help to take your pain medicine 30 to 60 minutes before these activities. Reduce your stress by getting enough sleep, working on hobbies you enjoy and practicing relaxation or meditation. Talk to your care team about ways to manage your pain beyond prescription opioids.    These medicines have risks:    DO NOT drive when on new or higher doses of pain medicine. These medicines can affect your alertness and reaction times, and you could be arrested for driving under the influence (DUI). If you need to use opioids long-term, talk to your care team about driving.    DO NOT operate heavy machinery    DO NOT do any other dangerous activities while taking these medicines.    DO NOT drink any alcohol while taking these medicines.     If the opioid prescribed includes acetaminophen, DO NOT take with any other medicines that contain acetaminophen. Read all labels carefully. Look for the word  acetaminophen  or  Tylenol.  Ask your pharmacist if you have questions or are unsure.    You can get addicted to pain medicines, especially if you have a history of addiction (chemical, alcohol or substance dependence). Talk to your care team about ways to reduce this risk.    All opioids tend to cause constipation. Drink plenty of water and eat foods that have a lot of fiber, such as fruits, vegetables, prune juice, apple juice and  high-fiber cereal. Take a laxative (Miralax, milk of magnesia, Colace, Senna) if you don t move your bowels at least every other day. Other side effects include upset stomach, sleepiness, dizziness, throwing up, tolerance (needing more of the medicine to have the same effect), physical dependence and slowed breathing.    Store your pills in a secure place, locked if possible. We will not replace any lost or stolen medicine. If you don t finish your medicine, please throw away (dispose) as directed by your pharmacist. The [place] Pollution Control Agency has more information about safe disposal: https://www.pca.[facility].[place].us/living-green/managing-unwanted-medications             Medication List: This is a list of all your medications and when to take them. Check marks below indicate your daily home schedule. Keep this list as a reference.      Medications           Morning Afternoon Evening Bedtime As Needed    augmented betamethasone dipropionate 0.05 % ointment   Commonly known as:  DIPROLENE-AF   Apply topically as needed                                azelastine 0.05 % ophthalmic solution   Commonly known as:  OPTIVAR   Place 1 drop into both eyes daily                                CALCIUM-D PO   Take by mouth daily                                clotrimazole-betamethasone cream   Commonly known as:  LOTRISONE   APPLY TO THE AFFECTED AREA TWICE DAILY AS NEEDED                                DERMA-SMOOTHE/FS SCALP 0.01 % Oil oil   Apply topically 2 times daily   Generic drug:  Fluocinolone Acetonide Scalp                                diclofenac 1 % Gel topical gel   Commonly known as:  VOLTAREN   Place onto the skin 2 times daily as needed for moderate pain                                HYDROcodone-acetaminophen 5-325 MG per tablet   Commonly known as:  NORCO   Take 1-2 tablets by mouth every 6 hours as needed for moderate to severe pain                                ibuprofen 600 MG tablet   Commonly known  as:  ADVIL/MOTRIN   Take 1 tablet (600 mg) by mouth every 8 hours as needed for other (mild and/or inflammatory pain) Take 1 tablet (600) by mouth with food  every 8 hours for 5 days   Notes to Patient:  Today you received Toradol, an antiinflammatory medication similar to Ibuprofen.  You should not take other antiinflammatory medication, such as Ibuprofen, Motrin, Advil, Aleve, Naprosyn, etc until 9pm.                                 levothyroxine 75 MCG tablet   Commonly known as:  SYNTHROID/LEVOTHROID   Take 1 tablet (75 mcg) by mouth daily                                PRAVACHOL PO   Take 40 mg by mouth daily                                triamcinolone 0.1 % ointment   Commonly known as:  KENALOG   Apply sparingly twice a day to affected area.

## 2018-11-01 NOTE — ANESTHESIA POSTPROCEDURE EVALUATION
Patient: Frances Lai    Procedure(s):  BUNIONECTOMY LEFT FOOT (TRILLIANT SCREWS, OSCILLATING SAW)^    Diagnosis:HALLUX VALGUS   Diagnosis Additional Information: No value filed.    Anesthesia Type:  MAC    Note:  Anesthesia Post Evaluation    Patient location during evaluation: PACU  Patient participation: Able to fully participate in evaluation  Level of consciousness: awake and alert  Pain management: adequate  Airway patency: patent  Cardiovascular status: acceptable  Respiratory status: acceptable  Hydration status: acceptable  PONV: none     Anesthetic complications: None          Last vitals:  Vitals:    11/01/18 1530 11/01/18 1545 11/01/18 1659   BP: 108/62 108/62 114/62   Pulse:      Resp: 18 18 16   Temp:      SpO2: 98% 98% 95%         Electronically Signed By: Adam Briceño MD  November 1, 2018  5:25 PM

## 2018-11-01 NOTE — IP AVS SNAPSHOT
[facility] Same Day Surgery    [address]    [place] [address]    Phone:  [phone]    Fax:  [phone]                                       After Visit Summary   11/1/2018    Frances Lai    MRN: [id number]           After Visit Summary Signature Page     I have received my discharge instructions, and my questions have been answered. I have discussed any challenges I see with this plan with the nurse or doctor.    ..........................................................................................................................................  Patient/Patient Representative Signature      ..........................................................................................................................................  Patient Representative Print Name and Relationship to Patient    ..................................................               ................................................  Date                                   Time    ..........................................................................................................................................  Reviewed by Signature/Title    ...................................................              ..............................................  Date                                               Time          22EPIC Rev 08/18

## 2018-11-05 NOTE — OP NOTE
Procedure Date: 11/01/2018      INDICATIONS FOR SURGERY:  The patient has suffered painful bunion deformities over a number of years and they have progressively worsened.  Conservative treatment including accommodative shoe gear has not rendered the patient asymptomatic.  Due to the progressive painful nature of the condition, the failure of conservative treatment, surgical intervention is appropriate.  The radiographs correlate well with clinical findings.  We will correct the left deformity today and the right at a later date.  The left radiographs show increased first intermetatarsal angle with increased hallux abductus angle.  Tibial sesamoid is in the #7 position.      PREOPERATIVE DIAGNOSES:  Hallux abductovalgus and metatarsus primus abductus, left.      POSTOPERATIVE DIAGNOSES:    Hallux abductovalgus and metatarsus primus abductus, left.      SURGEON:  RUSLAN Powers DPM      ANESTHESIA:  MAC      PROCEDURE:  Modified Randolph bunionectomy with joint and screw fixation, left.      The patient was taken to the OR and placed in supine position.  The patient was anesthetized via a Rebolledo block to the first ray, left foot.  The left foot was prepped and draped in the usual aseptic technique.  A pneumatic cuff at distal one-third aspect of the left leg was used for hemostasis.      A curvilinear incision placed the medial dorsal aspect of the first metatarsophalangeal joint, extending from the base of the first proximal phalanx to the midshaft of the first metatarsal, left foot.  Skin incision was deepened, skin flaps underscored, and vital structures were retracted.  Superficial vessels were identified and coagulated utilizing the cautery unit.  Utilizing blunt and sharp dissection, the incision was carried deep in the first intermetatarsal space and lateral release was performed by releasing the deep transverse intermetatarsal ligament, the adductor hallucis tendon, and the fibular sesamoid.  A linear incision was  placed through the capsule over the medial dorsal aspect of the first metatarsophalangeal joint and extended to the periosteum over the distal half of the first metatarsal.  Utilizing blunt and sharp dissection, the capsule and periosteum reflected from the head and distal half of the first metatarsal.  A McGlamry elevator was then introduced into the first MTPJ and was coursed from dorsal to plantar and then proximal along the plantar condyles to free all fibrous tissue.      The hypertrophied medial eminence at the head of the first metatarsal was resected utilizing a sagittal saw.  Utilizing an oscillating saw, a transverse V osteotomy was placed through the head of the first metatarsal apex approximately 1 cm proximal to the articulating cartilage.  The arms of the osteotomy were approximately 50 degrees apart with the dorsal arm relatively long for acceptance of the screws.  The capital fragment was displaced laterally approximately 5 mm.  It was impacted on the shaft and noted to be in good position and was fixated utilizing two 2.4 cannulated joint screws 16 mm in length each in a lag type fashion.  The protruding bone at the distal shaft of the first metatarsal was resected flush with the capital fragment utilizing the sagittal saw.  Again, utilizing the sagittal saw, capital fragment was remodeled so all sharp edges and bony prominences were removed.  The surgical site was flushed with copious amounts of sterile saline, inspected, and found to be free of debris.  A capsulorrhaphy was performed by removing a rectangular portion of capsule from the medial aspect of the first metatarsophalangeal joint capsule.  This created a T-shaped capsular incision.  The capsule, periosteum, deep, and subcutaneous tissues were closed in layers utilizing continuous and interrupted sutures of 3-0 Vicryl.  The skin was closed utilizing one subcuticular suture of 5-0 Vicryl.  The incision was further maintained with Benzoin  and Steri-Strips.      Approximately 1 mL of dexamethasone and 10 mL of 0.5% Marcaine were infiltrated at the surgical site for postoperative edema and pain respectively.  Adaptic was applied to the surgical site followed by dressings of fluffs, 4 x 4s, and 2-inch Sasha.  The pneumatic cuff at distal one-third aspect of the left leg was released.  Capillary filling time to all digits of the left foot were found to be within normal limits.  Dressing was further maintained with tape and stockinette.      The patient tolerated the procedure well and left the OR in stable condition with less than 3 mL of estimated blood loss.  The patient was given postoperative instructions and Rx for postoperative medications.  The patient will be followed up in 1 week in the office.         ANALILIA JON DPM             D: 2018   T: 2018   MT: FRANKLIN      Name:     RONEL URBAN   MRN:      [id number]        Account:        [id number]   :      1955           Procedure Date: 2018      Document: [id number]

## 2023-02-14 ENCOUNTER — OFFICE VISIT (OUTPATIENT)
Dept: CARDIOLOGY | Facility: CLINIC | Age: 68
End: 2023-02-14
Payer: COMMERCIAL

## 2023-02-14 ENCOUNTER — TELEPHONE (OUTPATIENT)
Dept: CARDIOLOGY | Facility: CLINIC | Age: 68
End: 2023-02-14

## 2023-02-14 VITALS
SYSTOLIC BLOOD PRESSURE: 113 MMHG | BODY MASS INDEX: 22.63 KG/M2 | OXYGEN SATURATION: 99 % | HEIGHT: 62 IN | WEIGHT: 123 LBS | HEART RATE: 74 BPM | DIASTOLIC BLOOD PRESSURE: 74 MMHG

## 2023-02-14 DIAGNOSIS — R07.9 CHEST PAIN, UNSPECIFIED TYPE: Primary | ICD-10-CM

## 2023-02-14 PROCEDURE — 93000 ELECTROCARDIOGRAM COMPLETE: CPT | Performed by: INTERNAL MEDICINE

## 2023-02-14 PROCEDURE — 99203 OFFICE O/P NEW LOW 30 MIN: CPT | Performed by: INTERNAL MEDICINE

## 2023-02-14 NOTE — PROGRESS NOTES
CARDIOLOGY CONSULT    REASON FOR CONSULT: chest pain    PRIMARY CARE PHYSICIAN:  Provider Not In System    HISTORY OF PRESENT ILLNESS:  Ms. Lai is a pleasant 67 year old female whop resents for the evaluation of chest pain.  Ms. Lai states that late fall she had a lingering upper respiratory infection.  She was evaluated at that time through her primary MD and was put on a course of prednisone.  She has also been experiencing chest discomfort that she states will come on at random and is not clearly associated with activity.  She feels somewhat short of breath.  Today, she states she has been experiencing the discomfort constantly for about a day and she became concerned about her heart.  The pain has since resolved.  She wanted to be seen urgently in cardiology and was not able to do so through [facility] and so made an appointment this morning at [place].      PAST MEDICAL HISTORY:  1.  Hyperlipidemia  2.  Colon polyp  3.  Hypothyroidism  4.  Osteopenia      MEDICATIONS:  Current Outpatient Medications   Medication     augmented betamethasone dipropionate (DIPROLENE-AF) 0.05 % ointment     azelastine (OPTIVAR) 0.05 % ophthalmic solution     Calcium Carbonate-Vitamin D (CALCIUM-D PO)     clotrimazole-betamethasone (LOTRISONE) cream     diclofenac (VOLTAREN) 1 % GEL topical gel     Fluocinolone Acetonide Scalp 0.01 % OIL oil     HYDROcodone-acetaminophen (NORCO) 5-325 MG per tablet     ibuprofen (ADVIL/MOTRIN) 600 MG tablet     levothyroxine (SYNTHROID/LEVOTHROID) 75 MCG tablet     Pravastatin Sodium (PRAVACHOL PO)     triamcinolone (KENALOG) 0.1 % ointment     No current facility-administered medications for this visit.       ALLERGIES:  No Known Allergies    SOCIAL HISTORY:  Nonsmoker, occasional ETOH    FAMILY HISTORY:  No family hx of early CAD    REVIEW OF SYSTEMS:  A complete ROS was obtained and the pertinent positives are outlined in the history of present illness above.  The remainder of systems is  negative.      PHYSICAL EXAM:      BP: 113/74 Pulse: 74     SpO2: 99 %      Vital Signs with Ranges  Pulse:  [74] 74  BP: (113)/(74) 113/74  SpO2:  [99 %] 99 %  123 lbs 0 oz    Constitutional: awake, alert, no distress  Eyes: PERRL, sclera nonicteric  ENT: trachea midline  Respiratory: CTAB  Cardiovascular: RRR no m/r/g, no JVD  GI: nondistended, nontender, bowel sounds present  Lymph/Hematologic: no lymphadenopathy  Skin: dry, no rash  Musculoskeletal: good muscle tone, no edema bilaterally  Neurologic: no focal deficits  Neuropsychiatric: appropriate affact    DATA:  Labs: Reviewed 8/26/22 reviewed personally  HgbA1C 5.7   mg/dl  HDL 55 mg/dL      EKG: Dated 2/14/23 reviewed personally.  Normal sinus rhythm without diagnostic ST segment changes      ASSESSMENT:  1.  Chest pain:  Atypical for cardiac etiology.  ECG unremarkable  2.  Prediabetes  3.  Hyperlipidemia    RECOMMENDATIONS:  1. Symptoms are atypical for cardiac etiology, however, she has risk factors for heart disease and is concerned about the status of her heart.  For further risk stratification, will proceed with exercise stress echocardiogram.  If this is unremarkable, she should follow up with her primary MD for noncardiac causes of chest pain. Discussed this in detail with Ms. Lai who is agreeable with the plan.        Silvia Campos MD [facility]  February 14, 2023

## 2023-02-14 NOTE — LETTER
Date:March 3, 2023      Provider requested that no letter be sent. Do not send.       [facility]

## 2023-02-14 NOTE — TELEPHONE ENCOUNTER
Health Call Center    Phone Message    May a detailed message be left on voicemail: yes     Reason for Call: Other: Patient has an appt today with  at 12:45 PM to establish care for chest pain.     Action Taken: Message routed to:  Other: Cardiology    Travel Screening: Not Applicable     Thank you!  [facility]

## 2023-02-14 NOTE — LETTER
2/14/2023    Provider Not In System       RE: Frances Lai       Dear Colleague,     I had the pleasure of seeing Frances Lai in the [facility].  CARDIOLOGY CONSULT    REASON FOR CONSULT: chest pain    PRIMARY CARE PHYSICIAN:  Provider Not In System    HISTORY OF PRESENT ILLNESS:  Ms. Lai is a pleasant 67 year old female whop resents for the evaluation of chest pain.  Ms. Lai states that late fall she had a lingering upper respiratory infection.  She was evaluated at that time through her primary MD and was put on a course of prednisone.  She has also been experiencing chest discomfort that she states will come on at random and is not clearly associated with activity.  She feels somewhat short of breath.  Today, she states she has been experiencing the discomfort constantly for about a day and she became concerned about her heart.  The pain has since resolved.  She wanted to be seen urgently in cardiology and was not able to do so through [facility] and so made an appointment this morning at [place].      PAST MEDICAL HISTORY:  1.  Hyperlipidemia  2.  Colon polyp  3.  Hypothyroidism  4.  Osteopenia      MEDICATIONS:  Current Outpatient Medications   Medication     augmented betamethasone dipropionate (DIPROLENE-AF) 0.05 % ointment     azelastine (OPTIVAR) 0.05 % ophthalmic solution     Calcium Carbonate-Vitamin D (CALCIUM-D PO)     clotrimazole-betamethasone (LOTRISONE) cream     diclofenac (VOLTAREN) 1 % GEL topical gel     Fluocinolone Acetonide Scalp 0.01 % OIL oil     HYDROcodone-acetaminophen (NORCO) 5-325 MG per tablet     ibuprofen (ADVIL/MOTRIN) 600 MG tablet     levothyroxine (SYNTHROID/LEVOTHROID) 75 MCG tablet     Pravastatin Sodium (PRAVACHOL PO)     triamcinolone (KENALOG) 0.1 % ointment     No current facility-administered medications for this visit.       ALLERGIES:  No Known Allergies    SOCIAL HISTORY:  Nonsmoker, occasional ETOH    FAMILY HISTORY:  No family hx of early  CAD    REVIEW OF SYSTEMS:  A complete ROS was obtained and the pertinent positives are outlined in the history of present illness above.  The remainder of systems is negative.      PHYSICAL EXAM:      BP: 113/74 Pulse: 74     SpO2: 99 %      Vital Signs with Ranges  Pulse:  [74] 74  BP: (113)/(74) 113/74  SpO2:  [99 %] 99 %  123 lbs 0 oz    Constitutional: awake, alert, no distress  Eyes: PERRL, sclera nonicteric  ENT: trachea midline  Respiratory: CTAB  Cardiovascular: RRR no m/r/g, no JVD  GI: nondistended, nontender, bowel sounds present  Lymph/Hematologic: no lymphadenopathy  Skin: dry, no rash  Musculoskeletal: good muscle tone, no edema bilaterally  Neurologic: no focal deficits  Neuropsychiatric: appropriate affact    DATA:  Labs: Reviewed 8/26/22 reviewed personally  HgbA1C 5.7   mg/dl  HDL 55 mg/dL      EKG: Dated 2/14/23 reviewed personally.  Normal sinus rhythm without diagnostic ST segment changes      ASSESSMENT:  1.  Chest pain:  Atypical for cardiac etiology.  ECG unremarkable  2.  Prediabetes  3.  Hyperlipidemia    RECOMMENDATIONS:  1. Symptoms are atypical for cardiac etiology, however, she has risk factors for heart disease and is concerned about the status of her heart.  For further risk stratification, will proceed with exercise stress echocardiogram.  If this is unremarkable, she should follow up with her primary MD for noncardiac causes of chest pain. Discussed this in detail with Ms. Lai who is agreeable with the plan.        Silvia Campos MD [facility]  February 14, 2023          Thank you for allowing me to participate in the care of your patient.      Sincerely,     Silvia Campos MD     [facility]  cc:   No referring provider defined for this encounter.

## 2025-04-29 DIAGNOSIS — R05.9 COUGH: Primary | ICD-10-CM

## 2025-04-29 NOTE — Clinical Note
Future Appointments 8/25/2025  12:00 PM   [facility] PULMONARY FUNCTION      [facility]               8/25/2025  1:00 PM    Emy Zuniga MD     [facility]              [facility]

## 2025-08-25 ENCOUNTER — OFFICE VISIT (OUTPATIENT)
Dept: PULMONOLOGY | Facility: CLINIC | Age: 70
End: 2025-08-25
Payer: COMMERCIAL

## 2025-08-25 VITALS
OXYGEN SATURATION: 97 % | SYSTOLIC BLOOD PRESSURE: 109 MMHG | BODY MASS INDEX: 22.5 KG/M2 | HEART RATE: 70 BPM | WEIGHT: 123 LBS | DIASTOLIC BLOOD PRESSURE: 66 MMHG

## 2025-08-25 DIAGNOSIS — J45.30 MILD PERSISTENT ASTHMA, UNSPECIFIED WHETHER COMPLICATED: Primary | ICD-10-CM

## 2025-08-25 DIAGNOSIS — J30.89 NON-SEASONAL ALLERGIC RHINITIS DUE TO OTHER ALLERGIC TRIGGER: ICD-10-CM

## 2025-08-25 DIAGNOSIS — R05.3 CHRONIC COUGH: ICD-10-CM

## 2025-08-25 DIAGNOSIS — R05.9 COUGH: ICD-10-CM

## 2025-08-25 LAB
DLCOUNC-%PRED-PRE: 87 %
DLCOUNC-PRE: 15.87 ML/MIN/MMHG
DLCOUNC-PRED: 18.09 ML/MIN/MMHG
ERV-%PRED-PRE: 99 %
ERV-PRE: 0.67 L
ERV-PRED: 0.67 L
EXPTIME-PRE: 5.17 SEC
FEF2575-%PRED-PRE: 116 %
FEF2575-PRE: 2.02 L/SEC
FEF2575-PRED: 1.72 L/SEC
FEFMAX-%PRED-PRE: 99 %
FEFMAX-PRE: 5.39 L/SEC
FEFMAX-PRED: 5.4 L/SEC
FEV1-%PRED-PRE: 94 %
FEV1-PRE: 1.9 L
FEV1FEV6-PRED: 79 %
FEV1FVC-PRE: 82 %
FEV1FVC-PRED: 79 %
FEV1SVC-PRE: 82 L
FEV1SVC-PRED: 71 L
FIFMAX-PRE: 1.6 L/SEC
FRCPLETH-%PRED-PRE: 92 %
FRCPLETH-PRE: 2.36 L
FRCPLETH-PRED: 2.55 L
FVC-%PRED-PRE: 91 %
FVC-PRE: 2.33 L
FVC-PRED: 2.55 L
GAW-PRED: 1.03 L/S/CMH2O
IC-%PRED-PRE: 78 %
IC-PRE: 1.66 L
IC-PRED: 2.1 L
Lab: 102 %
RVPLETH-%PRED-PRE: 93 %
RVPLETH-PRE: 1.69 L
RVPLETH-PRED: 1.8 L
SGAW-PRED: 0.2 1/CMH2O*S
SRAW-PRED: < 4.76 CMH2O*S
TLCPLETH-%PRED-PRE: 86 %
TLCPLETH-PRE: 4.01 L
TLCPLETH-PRED: 4.65 L
VA-%PRED-PRE: 88 %
VA-PRE: 3.77 L
VC-%PRED-PRE: 81 %
VC-PRE: 2.32 L
VC-PRED: 2.84 L

## 2025-08-25 PROCEDURE — 3074F SYST BP LT 130 MM HG: CPT | Performed by: INTERNAL MEDICINE

## 2025-08-25 PROCEDURE — 94375 RESPIRATORY FLOW VOLUME LOOP: CPT | Performed by: INTERNAL MEDICINE

## 2025-08-25 PROCEDURE — 94726 PLETHYSMOGRAPHY LUNG VOLUMES: CPT | Performed by: INTERNAL MEDICINE

## 2025-08-25 PROCEDURE — 3078F DIAST BP <80 MM HG: CPT | Performed by: INTERNAL MEDICINE

## 2025-08-25 PROCEDURE — 94150 VITAL CAPACITY TEST: CPT | Performed by: INTERNAL MEDICINE

## 2025-08-25 PROCEDURE — 94729 DIFFUSING CAPACITY: CPT | Performed by: INTERNAL MEDICINE

## 2025-08-25 PROCEDURE — 99205 OFFICE O/P NEW HI 60 MIN: CPT | Mod: 25 | Performed by: INTERNAL MEDICINE

## 2025-08-25 PROCEDURE — 95012 NITRIC OXIDE EXP GAS DETER: CPT | Performed by: INTERNAL MEDICINE

## 2025-08-25 RX ORDER — BUDESONIDE AND FORMOTEROL FUMARATE DIHYDRATE 160; 4.5 UG/1; UG/1
AEROSOL RESPIRATORY (INHALATION)
Qty: 20.4 G | Refills: 11 | Status: SHIPPED | OUTPATIENT
Start: 2025-08-25

## (undated) DEVICE — SU VICRYL 3-0 FS-1 27" J442H

## (undated) DEVICE — GLOVE PROTEXIS MICRO 8.0  2D73PM80

## (undated) DEVICE — NDL 27GA 1.25" 305136

## (undated) DEVICE — SYR 10ML FINGER CONTROL W/O NDL 309695

## (undated) DEVICE — GLOVE PROTEXIS W/NEU-THERA 8.5  2D73TE85

## (undated) DEVICE — BLADE SAW OSCIL/SAG STRK MICRO 9.0X18.5X0.38MM 2296-003-105

## (undated) DEVICE — Device

## (undated) DEVICE — BNDG ROLLER GAUZE CONFORM 2"X4YD 41-52

## (undated) DEVICE — SU VICRYL 5-0 CPS-3 18" J844G

## (undated) DEVICE — SOL NACL 0.9% IRRIG 1000ML BOTTLE 07138-09

## (undated) DEVICE — BLADE SAW OSCIL/SAG STRK MICRO 5.5X18X0.38MM 2296-003-412

## (undated) DEVICE — SOL WATER IRRIG 1000ML BOTTLE 2F7114

## (undated) DEVICE — CAST STOCKINETTE 4" BIAS CUT

## (undated) DEVICE — PREP CHLORAPREP 26ML TINTED ORANGE  260815

## (undated) DEVICE — CAST PADDING 4" COTTON WEBRIL UNSTERILE 9084

## (undated) DEVICE — DRSG STERI STRIP 1/2X4" R1547

## (undated) DEVICE — DRSG KERLIX FLUFFS X5

## (undated) DEVICE — PACK EXTREMITY SOP15EXFSD

## (undated) DEVICE — SUCTION CANISTER MEDIVAC LINER 3000ML W/LID 65651-530

## (undated) DEVICE — DRSG GAUZE 4X4" 3033

## (undated) DEVICE — BLADE KNIFE SURG 15 371115

## (undated) DEVICE — LINEN TOWEL PACK X5 5464

## (undated) DEVICE — IMM LIMB ELEVATOR DC40-0203

## (undated) RX ORDER — PROPOFOL 10 MG/ML
INJECTION, EMULSION INTRAVENOUS
Status: DISPENSED
Start: 2018-11-01

## (undated) RX ORDER — LIDOCAINE HYDROCHLORIDE 20 MG/ML
INJECTION, SOLUTION EPIDURAL; INFILTRATION; INTRACAUDAL; PERINEURAL
Status: DISPENSED
Start: 2018-11-01

## (undated) RX ORDER — FENTANYL CITRATE 50 UG/ML
INJECTION, SOLUTION INTRAMUSCULAR; INTRAVENOUS
Status: DISPENSED
Start: 2018-11-01

## (undated) RX ORDER — DEXAMETHASONE SODIUM PHOSPHATE 4 MG/ML
INJECTION, SOLUTION INTRA-ARTICULAR; INTRALESIONAL; INTRAMUSCULAR; INTRAVENOUS; SOFT TISSUE
Status: DISPENSED
Start: 2018-11-01

## (undated) RX ORDER — ONDANSETRON 2 MG/ML
INJECTION INTRAMUSCULAR; INTRAVENOUS
Status: DISPENSED
Start: 2018-11-01

## (undated) RX ORDER — CEFAZOLIN SODIUM 2 G/100ML
INJECTION, SOLUTION INTRAVENOUS
Status: DISPENSED
Start: 2018-11-01

## (undated) RX ORDER — KETOROLAC TROMETHAMINE 30 MG/ML
INJECTION, SOLUTION INTRAMUSCULAR; INTRAVENOUS
Status: DISPENSED
Start: 2018-11-01